# Patient Record
Sex: FEMALE | Race: OTHER | HISPANIC OR LATINO
[De-identification: names, ages, dates, MRNs, and addresses within clinical notes are randomized per-mention and may not be internally consistent; named-entity substitution may affect disease eponyms.]

---

## 2020-04-10 ENCOUNTER — APPOINTMENT (OUTPATIENT)
Dept: DISASTER EMERGENCY | Facility: CLINIC | Age: 55
End: 2020-04-10

## 2020-04-10 ENCOUNTER — APPOINTMENT (OUTPATIENT)
Dept: DISASTER EMERGENCY | Facility: CLINIC | Age: 55
End: 2020-04-10
Payer: MEDICAID

## 2020-04-10 VITALS
TEMPERATURE: 98.3 F | OXYGEN SATURATION: 98 % | SYSTOLIC BLOOD PRESSURE: 155 MMHG | DIASTOLIC BLOOD PRESSURE: 84 MMHG | HEART RATE: 73 BPM

## 2020-04-10 DIAGNOSIS — Z20.828 CONTACT WITH AND (SUSPECTED) EXPOSURE TO OTHER VIRAL COMMUNICABLE DISEASES: ICD-10-CM

## 2020-04-10 PROCEDURE — 99202 OFFICE O/P NEW SF 15 MIN: CPT

## 2020-04-10 NOTE — HISTORY OF PRESENT ILLNESS
[Patient presents to the office today for COVID-19 evaluation and testing.] : Patient presents to the office today for COVID-19 evaluation and testing. [Patient has been pre-screened by RN at call center for appointment today with our facility.] : Patient has been pre-screened by RN at call center for appointment today with our facility. [FreeTextEntry1] : 53 yo F with no PMH sore throat, feverish/chills. Normal temp however. Headaches, fatigue for past 2 weeks now. Gets shortness of breath with lying down. Close contact to known positive.  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 Patient told to seek emergent care if symptoms occur. [TextBox_66] : none

## 2020-04-11 ENCOUNTER — TRANSCRIPTION ENCOUNTER (OUTPATIENT)
Age: 55
End: 2020-04-11

## 2020-04-11 LAB — SARS-COV-2 N GENE NPH QL NAA+PROBE: NOT DETECTED

## 2021-05-08 ENCOUNTER — EMERGENCY (EMERGENCY)
Facility: HOSPITAL | Age: 56
LOS: 1 days | Discharge: HOME | End: 2021-05-08
Attending: EMERGENCY MEDICINE
Payer: MEDICAID

## 2021-05-08 VITALS
HEIGHT: 62 IN | TEMPERATURE: 98 F | OXYGEN SATURATION: 95 % | RESPIRATION RATE: 17 BRPM | DIASTOLIC BLOOD PRESSURE: 91 MMHG | WEIGHT: 195.99 LBS | SYSTOLIC BLOOD PRESSURE: 163 MMHG | HEART RATE: 78 BPM

## 2021-05-08 LAB
BASOPHILS # BLD AUTO: 0.07 K/UL — SIGNIFICANT CHANGE UP (ref 0–0.2)
BASOPHILS NFR BLD AUTO: 0.8 % — SIGNIFICANT CHANGE UP (ref 0–2)
EOSINOPHIL # BLD AUTO: 0.14 K/UL — SIGNIFICANT CHANGE UP (ref 0–0.5)
EOSINOPHIL NFR BLD AUTO: 1.6 % — SIGNIFICANT CHANGE UP (ref 0–6)
HCT VFR BLD CALC: 38.3 % — SIGNIFICANT CHANGE UP (ref 34.5–45)
HGB BLD-MCNC: 12 G/DL — SIGNIFICANT CHANGE UP (ref 11.5–15.5)
IMM GRANULOCYTES NFR BLD AUTO: 0.5 % — SIGNIFICANT CHANGE UP (ref 0–1.5)
LYMPHOCYTES # BLD AUTO: 3.63 K/UL — HIGH (ref 1–3.3)
LYMPHOCYTES # BLD AUTO: 41.1 % — SIGNIFICANT CHANGE UP (ref 13–44)
MCHC RBC-ENTMCNC: 28.5 PG — SIGNIFICANT CHANGE UP (ref 27–34)
MCHC RBC-ENTMCNC: 31.3 GM/DL — LOW (ref 32–36)
MCV RBC AUTO: 91 FL — SIGNIFICANT CHANGE UP (ref 80–100)
MONOCYTES # BLD AUTO: 0.6 K/UL — SIGNIFICANT CHANGE UP (ref 0–0.9)
MONOCYTES NFR BLD AUTO: 6.8 % — SIGNIFICANT CHANGE UP (ref 2–14)
NEUTROPHILS # BLD AUTO: 4.36 K/UL — SIGNIFICANT CHANGE UP (ref 1.8–7.4)
NEUTROPHILS NFR BLD AUTO: 49.2 % — SIGNIFICANT CHANGE UP (ref 43–77)
NRBC # BLD: 0 /100 WBCS — SIGNIFICANT CHANGE UP (ref 0–0)
PLATELET # BLD AUTO: 562 K/UL — HIGH (ref 150–400)
RBC # BLD: 4.21 M/UL — SIGNIFICANT CHANGE UP (ref 3.8–5.2)
RBC # FLD: 13.6 % — SIGNIFICANT CHANGE UP (ref 10.3–14.5)
WBC # BLD: 8.84 K/UL — SIGNIFICANT CHANGE UP (ref 3.8–10.5)
WBC # FLD AUTO: 8.84 K/UL — SIGNIFICANT CHANGE UP (ref 3.8–10.5)

## 2021-05-08 PROCEDURE — 93010 ELECTROCARDIOGRAM REPORT: CPT

## 2021-05-08 PROCEDURE — 99285 EMERGENCY DEPT VISIT HI MDM: CPT

## 2021-05-09 VITALS
RESPIRATION RATE: 18 BRPM | OXYGEN SATURATION: 97 % | DIASTOLIC BLOOD PRESSURE: 88 MMHG | HEART RATE: 80 BPM | TEMPERATURE: 98 F | SYSTOLIC BLOOD PRESSURE: 142 MMHG

## 2021-05-09 LAB
ALBUMIN SERPL ELPH-MCNC: 3.8 G/DL — SIGNIFICANT CHANGE UP (ref 3.3–5)
ALP SERPL-CCNC: 104 U/L — SIGNIFICANT CHANGE UP (ref 40–120)
ALT FLD-CCNC: 79 U/L — HIGH (ref 10–45)
ANION GAP SERPL CALC-SCNC: 12 MMOL/L — SIGNIFICANT CHANGE UP (ref 5–17)
APTT BLD: 30.7 SEC — SIGNIFICANT CHANGE UP (ref 27.5–35.5)
AST SERPL-CCNC: 31 U/L — SIGNIFICANT CHANGE UP (ref 10–40)
BILIRUB SERPL-MCNC: 0.2 MG/DL — SIGNIFICANT CHANGE UP (ref 0.2–1.2)
BUN SERPL-MCNC: 21 MG/DL — SIGNIFICANT CHANGE UP (ref 7–23)
CALCIUM SERPL-MCNC: 9.2 MG/DL — SIGNIFICANT CHANGE UP (ref 8.4–10.5)
CHLORIDE SERPL-SCNC: 105 MMOL/L — SIGNIFICANT CHANGE UP (ref 96–108)
CO2 SERPL-SCNC: 24 MMOL/L — SIGNIFICANT CHANGE UP (ref 22–31)
CREAT SERPL-MCNC: 0.88 MG/DL — SIGNIFICANT CHANGE UP (ref 0.5–1.3)
GLUCOSE SERPL-MCNC: 99 MG/DL — SIGNIFICANT CHANGE UP (ref 70–99)
INR BLD: 0.99 RATIO — SIGNIFICANT CHANGE UP (ref 0.88–1.16)
POTASSIUM SERPL-MCNC: 4.4 MMOL/L — SIGNIFICANT CHANGE UP (ref 3.5–5.3)
POTASSIUM SERPL-SCNC: 4.4 MMOL/L — SIGNIFICANT CHANGE UP (ref 3.5–5.3)
PROT SERPL-MCNC: 7.4 G/DL — SIGNIFICANT CHANGE UP (ref 6–8.3)
PROTHROM AB SERPL-ACNC: 11.9 SEC — SIGNIFICANT CHANGE UP (ref 10.6–13.6)
SODIUM SERPL-SCNC: 141 MMOL/L — SIGNIFICANT CHANGE UP (ref 135–145)
TROPONIN T, HIGH SENSITIVITY RESULT: <6 NG/L — SIGNIFICANT CHANGE UP (ref 0–51)

## 2021-05-09 PROCEDURE — 71275 CT ANGIOGRAPHY CHEST: CPT

## 2021-05-09 PROCEDURE — 85610 PROTHROMBIN TIME: CPT

## 2021-05-09 PROCEDURE — 99284 EMERGENCY DEPT VISIT MOD MDM: CPT | Mod: 25

## 2021-05-09 PROCEDURE — 36000 PLACE NEEDLE IN VEIN: CPT | Mod: XU

## 2021-05-09 PROCEDURE — 93005 ELECTROCARDIOGRAM TRACING: CPT

## 2021-05-09 PROCEDURE — 80053 COMPREHEN METABOLIC PANEL: CPT

## 2021-05-09 PROCEDURE — 85025 COMPLETE CBC W/AUTO DIFF WBC: CPT

## 2021-05-09 PROCEDURE — 84484 ASSAY OF TROPONIN QUANT: CPT

## 2021-05-09 PROCEDURE — 71275 CT ANGIOGRAPHY CHEST: CPT | Mod: 26,MA

## 2021-05-09 PROCEDURE — 85730 THROMBOPLASTIN TIME PARTIAL: CPT

## 2021-05-09 NOTE — ED PROVIDER NOTE - ATTENDING CONTRIBUTION TO CARE
Patient is a 56 yo F with no known chronic medical problems here for evaluation of 4 days of pleuritic chest pain associated with shortness of breath. She reports having COVID in April (4/18). She denies any fevers, chills, nausea, vomiting. No history of travel. No prior DVT/PE. She had an outpatient work up and was told her d-dimer was elevated so she should come in for a CT Scan to evaluate for PE.     VS noted  Gen. no acute distress, Non toxic   HEENT: EOMI, mmm  Lungs: CTAB/L no C/ W /R   CVS: RRR   Abd; Soft non tender, non distended   Ext: no edema, no calf tenderness  Skin: no rash  Neuro AAOx3 non focal clear speech  a/p: elevated d-dimer on outpatient labs - plan for ekg, labs, CTA Chest to evaluate for PE.   - Eveline SANCHEZ

## 2021-05-09 NOTE — ED PROVIDER NOTE - PATIENT PORTAL LINK FT
You can access the FollowMyHealth Patient Portal offered by [facility] by registering at the following website: [email or web address]. By joining [email or web address]’s FollowMyHealth portal, you will also be able to view your health information using other applications (apps) compatible with our system.

## 2021-05-09 NOTE — ED ADULT NURSE NOTE - OBJECTIVE STATEMENT
55 year old female presents ambulatory to ED after being instructed to by her PCP for elevated D-dimer labs. Pt is complaining of chest discomfort, pleuritic, constant since onset (3-4 days ago) and SOB. Pt was diagnosed with COVID on April 18th and reports that she has had a consistent cough since then. D-dimer 2600 outpatient. Pt denies  headache, nausea, vomiting, diarrhaea, abdominal pain, fever, chills, urinary symptoms, lightheadedness, dizziness, changes in vision

## 2021-05-09 NOTE — ED PROVIDER NOTE - NSFOLLOWUPINSTRUCTIONS_ED_ALL_ED_FT
Please see attached information on chest pain.     Return to the ER immediately for new or worsening chest pain, shortness of breath, or any other concerning symptoms.     Follow up with your PCP as discussed.     You can take over the counter Tylenol and Motrin for you pain.

## 2021-05-09 NOTE — ED PROVIDER NOTE - CLINICAL SUMMARY MEDICAL DECISION MAKING FREE TEXT BOX
55F presenting w SOB/pleuritic CP - recent covid infection w elevated dimer. PE: VSS, clear lungs. Ddx: Will assess for PE given symptoms and dimer. Plan: Labs, CTA. reassess. JESSICA Palma PGY2

## 2021-05-09 NOTE — ED PROVIDER NOTE - OBJECTIVE STATEMENT
55F w no known pmhx presenting with CC of CP, pleuritic, constant since onset (3-4 days ago). Associated with SOB. Pt was diagnosed with COVID on April 18th. Symptoms at that time included f/c, n/v, cough. Other symptoms resolved, but cough is persistent. Denies any leg pain/swelling, hemoptysis, history of DVT/PE, malignancy, hormone use, recent surgery, immobilization, or trauma. D-dimer 2600 outpatient.

## 2021-07-20 NOTE — ED PROVIDER NOTE - NO SIGNIFICANT PAST SURGICAL HISTORY
<<----- Click to add NO significant Past Surgical History 4 = Moderately ill – overt symptoms causing noticeable, but modest, functional impairment or distress; symptom level may warrant medication 4 = Moderately ill – overt symptoms causing noticeable, but modest, functional impairment or distress; symptom level may warrant medication 4 = Moderately ill – overt symptoms causing noticeable, but modest, functional impairment or distress; symptom level may warrant medication 4 = Moderately ill – overt symptoms causing noticeable, but modest, functional impairment or distress; symptom level may warrant medication 4 = Moderately ill – overt symptoms causing noticeable, but modest, functional impairment or distress; symptom level may warrant medication 4 = Moderately ill – overt symptoms causing noticeable, but modest, functional impairment or distress; symptom level may warrant medication 4 = Moderately ill – overt symptoms causing noticeable, but modest, functional impairment or distress; symptom level may warrant medication 4 = Moderately ill – overt symptoms causing noticeable, but modest, functional impairment or distress; symptom level may warrant medication 4 = Moderately ill – overt symptoms causing noticeable, but modest, functional impairment or distress; symptom level may warrant medication 4 = Moderately ill – overt symptoms causing noticeable, but modest, functional impairment or distress; symptom level may warrant medication 4 = Moderately ill – overt symptoms causing noticeable, but modest, functional impairment or distress; symptom level may warrant medication

## 2021-08-14 ENCOUNTER — APPOINTMENT (OUTPATIENT)
Dept: DISASTER EMERGENCY | Facility: OTHER | Age: 56
End: 2021-08-14
Payer: COMMERCIAL

## 2021-08-14 PROBLEM — Z78.9 OTHER SPECIFIED HEALTH STATUS: Chronic | Status: ACTIVE | Noted: 2021-05-09

## 2021-08-14 PROCEDURE — 0001A: CPT

## 2021-09-18 ENCOUNTER — APPOINTMENT (OUTPATIENT)
Dept: DISASTER EMERGENCY | Facility: OTHER | Age: 56
End: 2021-09-18
Payer: MEDICAID

## 2021-09-18 PROCEDURE — 0002A: CPT

## 2022-05-31 ENCOUNTER — NON-APPOINTMENT (OUTPATIENT)
Age: 57
End: 2022-05-31

## 2022-08-08 ENCOUNTER — NON-APPOINTMENT (OUTPATIENT)
Age: 57
End: 2022-08-08

## 2023-06-07 ENCOUNTER — EMERGENCY (EMERGENCY)
Facility: HOSPITAL | Age: 58
LOS: 1 days | Discharge: HOME | End: 2023-06-07
Attending: EMERGENCY MEDICINE
Payer: MEDICAID

## 2023-06-07 VITALS
RESPIRATION RATE: 19 BRPM | DIASTOLIC BLOOD PRESSURE: 93 MMHG | WEIGHT: 210.1 LBS | SYSTOLIC BLOOD PRESSURE: 196 MMHG | OXYGEN SATURATION: 96 % | HEART RATE: 66 BPM | TEMPERATURE: 98 F | HEIGHT: 62 IN

## 2023-06-07 VITALS
HEART RATE: 78 BPM | RESPIRATION RATE: 18 BRPM | DIASTOLIC BLOOD PRESSURE: 92 MMHG | SYSTOLIC BLOOD PRESSURE: 171 MMHG | OXYGEN SATURATION: 94 % | TEMPERATURE: 98 F

## 2023-06-07 LAB
ALBUMIN SERPL ELPH-MCNC: 4.3 G/DL — SIGNIFICANT CHANGE UP (ref 3.3–5)
ALP SERPL-CCNC: 118 U/L — SIGNIFICANT CHANGE UP (ref 40–120)
ALT FLD-CCNC: 43 U/L — SIGNIFICANT CHANGE UP (ref 10–45)
ANION GAP SERPL CALC-SCNC: 12 MMOL/L — SIGNIFICANT CHANGE UP (ref 5–17)
AST SERPL-CCNC: 25 U/L — SIGNIFICANT CHANGE UP (ref 10–40)
BASOPHILS # BLD AUTO: 0.06 K/UL — SIGNIFICANT CHANGE UP (ref 0–0.2)
BASOPHILS NFR BLD AUTO: 0.7 % — SIGNIFICANT CHANGE UP (ref 0–2)
BILIRUB SERPL-MCNC: 0.3 MG/DL — SIGNIFICANT CHANGE UP (ref 0.2–1.2)
BUN SERPL-MCNC: 11 MG/DL — SIGNIFICANT CHANGE UP (ref 7–23)
CALCIUM SERPL-MCNC: 9.3 MG/DL — SIGNIFICANT CHANGE UP (ref 8.4–10.5)
CHLORIDE SERPL-SCNC: 108 MMOL/L — SIGNIFICANT CHANGE UP (ref 96–108)
CO2 SERPL-SCNC: 24 MMOL/L — SIGNIFICANT CHANGE UP (ref 22–31)
CREAT SERPL-MCNC: 0.63 MG/DL — SIGNIFICANT CHANGE UP (ref 0.5–1.3)
D DIMER BLD IA.RAPID-MCNC: <150 NG/ML DDU — SIGNIFICANT CHANGE UP
EGFR: 102 ML/MIN/1.73M2 — SIGNIFICANT CHANGE UP
EOSINOPHIL # BLD AUTO: 0.14 K/UL — SIGNIFICANT CHANGE UP (ref 0–0.5)
EOSINOPHIL NFR BLD AUTO: 1.7 % — SIGNIFICANT CHANGE UP (ref 0–6)
FLUAV AG NPH QL: SIGNIFICANT CHANGE UP
FLUBV AG NPH QL: SIGNIFICANT CHANGE UP
GLUCOSE SERPL-MCNC: 104 MG/DL — HIGH (ref 70–99)
HCT VFR BLD CALC: 40.8 % — SIGNIFICANT CHANGE UP (ref 34.5–45)
HGB BLD-MCNC: 13.3 G/DL — SIGNIFICANT CHANGE UP (ref 11.5–15.5)
IMM GRANULOCYTES NFR BLD AUTO: 0.4 % — SIGNIFICANT CHANGE UP (ref 0–0.9)
LYMPHOCYTES # BLD AUTO: 2.91 K/UL — SIGNIFICANT CHANGE UP (ref 1–3.3)
LYMPHOCYTES # BLD AUTO: 35.2 % — SIGNIFICANT CHANGE UP (ref 13–44)
MCHC RBC-ENTMCNC: 29.4 PG — SIGNIFICANT CHANGE UP (ref 27–34)
MCHC RBC-ENTMCNC: 32.6 GM/DL — SIGNIFICANT CHANGE UP (ref 32–36)
MCV RBC AUTO: 90.1 FL — SIGNIFICANT CHANGE UP (ref 80–100)
MONOCYTES # BLD AUTO: 0.75 K/UL — SIGNIFICANT CHANGE UP (ref 0–0.9)
MONOCYTES NFR BLD AUTO: 9.1 % — SIGNIFICANT CHANGE UP (ref 2–14)
NEUTROPHILS # BLD AUTO: 4.38 K/UL — SIGNIFICANT CHANGE UP (ref 1.8–7.4)
NEUTROPHILS NFR BLD AUTO: 52.9 % — SIGNIFICANT CHANGE UP (ref 43–77)
NRBC # BLD: 0 /100 WBCS — SIGNIFICANT CHANGE UP (ref 0–0)
NT-PROBNP SERPL-SCNC: 58 PG/ML — SIGNIFICANT CHANGE UP (ref 0–300)
PLATELET # BLD AUTO: 304 K/UL — SIGNIFICANT CHANGE UP (ref 150–400)
POTASSIUM SERPL-MCNC: 3.8 MMOL/L — SIGNIFICANT CHANGE UP (ref 3.5–5.3)
POTASSIUM SERPL-SCNC: 3.8 MMOL/L — SIGNIFICANT CHANGE UP (ref 3.5–5.3)
PROT SERPL-MCNC: 7.6 G/DL — SIGNIFICANT CHANGE UP (ref 6–8.3)
RBC # BLD: 4.53 M/UL — SIGNIFICANT CHANGE UP (ref 3.8–5.2)
RBC # FLD: 13.5 % — SIGNIFICANT CHANGE UP (ref 10.3–14.5)
RSV RNA NPH QL NAA+NON-PROBE: SIGNIFICANT CHANGE UP
SARS-COV-2 RNA SPEC QL NAA+PROBE: SIGNIFICANT CHANGE UP
SODIUM SERPL-SCNC: 144 MMOL/L — SIGNIFICANT CHANGE UP (ref 135–145)
TROPONIN T, HIGH SENSITIVITY RESULT: <6 NG/L — SIGNIFICANT CHANGE UP (ref 0–51)
WBC # BLD: 8.27 K/UL — SIGNIFICANT CHANGE UP (ref 3.8–10.5)
WBC # FLD AUTO: 8.27 K/UL — SIGNIFICANT CHANGE UP (ref 3.8–10.5)

## 2023-06-07 PROCEDURE — 80053 COMPREHEN METABOLIC PANEL: CPT

## 2023-06-07 PROCEDURE — 73564 X-RAY EXAM KNEE 4 OR MORE: CPT

## 2023-06-07 PROCEDURE — 71045 X-RAY EXAM CHEST 1 VIEW: CPT | Mod: 26

## 2023-06-07 PROCEDURE — 99284 EMERGENCY DEPT VISIT MOD MDM: CPT

## 2023-06-07 PROCEDURE — 93005 ELECTROCARDIOGRAM TRACING: CPT

## 2023-06-07 PROCEDURE — 85379 FIBRIN DEGRADATION QUANT: CPT

## 2023-06-07 PROCEDURE — 87637 SARSCOV2&INF A&B&RSV AMP PRB: CPT

## 2023-06-07 PROCEDURE — 84484 ASSAY OF TROPONIN QUANT: CPT

## 2023-06-07 PROCEDURE — 93971 EXTREMITY STUDY: CPT

## 2023-06-07 PROCEDURE — 83880 ASSAY OF NATRIURETIC PEPTIDE: CPT

## 2023-06-07 PROCEDURE — 93971 EXTREMITY STUDY: CPT | Mod: 26,LT

## 2023-06-07 PROCEDURE — 99285 EMERGENCY DEPT VISIT HI MDM: CPT | Mod: 25

## 2023-06-07 PROCEDURE — 71045 X-RAY EXAM CHEST 1 VIEW: CPT

## 2023-06-07 PROCEDURE — 85025 COMPLETE CBC W/AUTO DIFF WBC: CPT

## 2023-06-07 PROCEDURE — 73564 X-RAY EXAM KNEE 4 OR MORE: CPT | Mod: 26,LT

## 2023-06-07 RX ORDER — ACETAMINOPHEN 500 MG
1000 TABLET ORAL ONCE
Refills: 0 | Status: COMPLETED | OUTPATIENT
Start: 2023-06-07 | End: 2023-06-07

## 2023-06-07 NOTE — ED PROVIDER NOTE - PHYSICAL EXAMINATION
Physical Exam:   GEN: in no acute distress, AAOx3  HENT: NCAT, MMM, no stridor  EYES: PERRLA, EOMI  RESP: CTAB, no respiratory distress  CV: normal rate and regular rhythm, S1 S2  ABD: soft and NTND  EXT: No edema, No bony deformity of extremities. no swelling or color changes or warmth noted htorughout extremity, no ttp or pain with ranging of lower extremities at knee, ankle , or hip.   SKIN: No skin breaks, skin color normal for race  NEURO: CN grossly intact, No focal motor or sensory deficits.

## 2023-06-07 NOTE — ED ADULT NURSE NOTE - OBJECTIVE STATEMENT
pt 59 yo female states 2 episodes sharp shock like pain posterior left knee radiating to foot yesterday lasting few minutes pt denies injury to area pt also states prior to this some headache and posterior right lung pain pt denies any hx or meds denies visual changes pt states pain at a 1 very little to leg on arrival to [place] pt skin warm dry pt with ilateral good pedal pulses color within normal limits sensory intact pt found to be hypertensive on arrival denies any hx of such no recent long plane or car rides pt pending eval by md in [place] area

## 2023-06-07 NOTE — ED PROVIDER NOTE - ATTENDING CONTRIBUTION TO CARE
Marquis Rainey MD:  I personally saw the patient and performed a substantive portion of the visit including all aspects of the medical decision making.    MDM: 58-year-old female who is otherwise healthy who presents with left posterior knee pain with tingling that radiates down the posterior lower extremity to the level of the ankle, as well as pain to the upper right back.  Patient states she has no low back pain, and no radiation of symptoms from the knee downwards, or any symptoms affecting the buttock or thigh.    ROS: patient denies fevers, chills, cough, chest pain, shortness of breath, nausea, vomiting, diaphoresis, dizziness, syncope, leg pain/swelling, low back pain, saddle anesthesia, bowel or bladder dysfunction, numbness, or weakness.    On examination, patient with elevated blood pressure, of 196/93, which improved to 173/101 prior to intervention, otherwise stable vitals.  Patient is well-appearing, nontoxic, no acute distress.  Cardiac RRR, lungs CTAB with no wheezing/rubs/rhonchi.  No C/T/L-spine tenderness, with normal range of motion of spine.  Normal motor and sensation in all dermatomes and myotomes of all 4 extremities.  Left posterior knee with mild tenderness, but no fullness, mild tenderness over the left calf.    Will keep patient on cardiac monitor, obtain EKG and troponin to evaluate for possible cardiac abnormality including ACS and arrhythmia.  Will obtain chest x-ray to evaluate for acute cardiopulmonary pathology.  Will obtain x-ray of left knee.  Will obtain ultrasound of left leg to assess for DVT.  Will obtain D-dimer to assess for PE.    My independent interpretation of the EKG shows:  Normal sinus rhythm with rate of 61 bpm, normal axis, isolated T wave version in lead III, no ST elevations or depressions.     Differential includes but is not limited to: See above    Patient with new problems requiring additional work-up and treatment, following orders: see above  Discussed case with: N/A  Obtained and reviewed external records: Prior ED record  Additional history obtained from:  about  Chronic conditions and social determinants of health affecting care:   See above Marquis Rainey MD:  I personally saw the patient and performed a substantive portion of the visit including all aspects of the medical decision making.    MDM: 58-year-old female who is otherwise healthy who presents with left posterior knee pain with tingling that radiates down the posterior lower extremity to the level of the ankle, as well as pain to the upper right back.  Patient states she has no low back pain, and no radiation of symptoms from the knee downwards, or any symptoms affecting the buttock or thigh.    ROS: patient denies fevers, chills, cough, chest pain, shortness of breath, nausea, vomiting, diaphoresis, dizziness, syncope, leg pain/swelling, low back pain, saddle anesthesia, bowel or bladder dysfunction, numbness, or weakness.    On examination, patient with elevated blood pressure, of 196/93, which improved to 173/101 prior to intervention, otherwise stable vitals.  Patient is well-appearing, nontoxic, no acute distress.  Cardiac RRR, lungs CTAB with no wheezing/rubs/rhonchi.  No C/T/L-spine tenderness, with normal range of motion of spine.  Normal motor and sensation in all dermatomes and myotomes of all 4 extremities.  Left posterior knee with mild tenderness, but no fullness, mild tenderness over the left calf.    Will keep patient on cardiac monitor, obtain EKG and troponin to evaluate for possible cardiac abnormality including ACS and arrhythmia.  Will obtain chest x-ray to evaluate for acute cardiopulmonary pathology.  Will obtain x-ray of left knee.  Will obtain ultrasound of left leg to assess for DVT.  Will obtain D-dimer to assess for PE.    My independent interpretation of the EKG shows:  Normal sinus rhythm with rate of 61 bpm, normal axis, isolated T wave version in lead III, no ST elevations or depressions.     Labs reviewed, nonactionable, D-dimer less than 150, troponin less than 6, 1 troponin indicated given duration of symptoms.    My independent interpretation of the chest and left knee x-ray images shows no focal consolidation of chest, no acute fracture or dislocation of knee.   More details to be seen in the official radiologist read.     Differential includes but is not limited to: See above    Patient with new problems requiring additional work-up and treatment, following orders: see above  Discussed case with: N/A  Obtained and reviewed external records: Prior ED record  Additional history obtained from:  about  Chronic conditions and social determinants of health affecting care:   See above Marquis Rainey MD:  I personally saw the patient and performed a substantive portion of the visit including all aspects of the medical decision making.    MDM: 58-year-old female who is otherwise healthy who presents with left posterior knee pain with tingling that radiates down the posterior lower extremity to the level of the ankle, as well as pain to the upper right back.  Patient states she has no low back pain, and no radiation of symptoms from the knee downwards, or any symptoms affecting the buttock or thigh.    ROS: patient denies fevers, chills, cough, chest pain, shortness of breath, nausea, vomiting, diaphoresis, dizziness, syncope, leg pain/swelling, low back pain, saddle anesthesia, bowel or bladder dysfunction, numbness, or weakness.    On examination, patient with elevated blood pressure, of 196/93, which improved to 173/101 prior to intervention, otherwise stable vitals.  Patient is well-appearing, nontoxic, no acute distress.  Cardiac RRR, lungs CTAB with no wheezing/rubs/rhonchi.  No C/T/L-spine tenderness, with normal range of motion of spine.  Normal motor and sensation in all dermatomes and myotomes of all 4 extremities.  Left posterior knee with mild tenderness, but no fullness, mild tenderness over the left calf.    Will keep patient on cardiac monitor, obtain EKG and troponin to evaluate for possible cardiac abnormality including ACS and arrhythmia.  Will obtain chest x-ray to evaluate for acute cardiopulmonary pathology.  Will obtain x-ray of left knee.  Will obtain ultrasound of left leg to assess for DVT.  Will obtain D-dimer to assess for PE.    My independent interpretation of the EKG shows:  Normal sinus rhythm with rate of 61 bpm, normal axis, isolated T wave version in lead III, no ST elevations or depressions.     Labs reviewed, nonactionable, D-dimer less than 150, troponin less than 6, 1 troponin indicated given duration of symptoms.    My independent interpretation of the chest and left knee x-ray images shows no focal consolidation of chest, no acute fracture or dislocation of knee.   More details to be seen in the official radiologist read.     Ultrasound negative for DVT.  Patient reports that their symptoms have improved. Stable vitals except for elevated blood pressure, which patient is not symptomatic from. Repeat cardiovascular examination shows NRRR, equal pulses in all 4 extremities. Repeat pulmonary examination shows equal bilateral lung sounds.  Repeat MSK examination shows normal ROM, soft compartments, and NVI. Ambulatory in the ED with no antalgic gait and no assistance.  Stable for discharge with close follow-up and strict return precautions. Discussed the indications and side-effects of applicable medications. The patient has been informed of all concerning signs and symptoms to return to Emergency Department, the necessity to follow up with PMD within 2-3 days, and to address elevated blood pressure with physician, and cardiologist in 3 to 4 days, and to avoid exertion until follow-up with cardiologist was explained, or to return to the ED if unable to follow-up appropriately, and the patient reports understanding of above with capacity and insight.    Differential includes but is not limited to: See above    Patient with new problems requiring additional work-up and treatment, following orders: see above  Discussed case with: N/A  Obtained and reviewed external records: Prior ED record  Additional history obtained from:  about  Chronic conditions and social determinants of health affecting care:   See above

## 2023-06-07 NOTE — ED PROVIDER NOTE - OBJECTIVE STATEMENT
58-year-old with no significant past medical history presenting with posterior left knee pain since yesterday, Works as a cleaning lady, had a significant drop yesterday, and was feeling pain since that morning, feeling worse today with some radiation to the calf and occasionally of the left anterior thigh.  Patient denies any blood clot history, recent travel, cancer history, recent surgeries.  Does state that her mother had a blood clot at some point, patient states patient states she has not had any significant symptoms like this since.  States she is occasionally feeling some pain in her right back chest but is not feeling it at this moment nor she feeling any calf or thigh pain at this moment.  Denies any dizziness, headache, trouble breathing, chest pain, fever, chills.

## 2023-06-07 NOTE — ED PROVIDER NOTE - CLINICAL SUMMARY MEDICAL DECISION MAKING FREE TEXT BOX
patient appearing well here, vital signs within normal limits, chest x-ray and labs including D-dimer proBNP and troponin all grossly negative, still pending D-dimer study to rule out any DVT in the lower extremity.

## 2023-06-07 NOTE — ED PROVIDER NOTE - PROGRESS NOTE DETAILS
Vital signs stable, patient asymptomatic, ultrasound negative for any signs of DVT, will discharge patient with instructions to follow-up with her doctor should her symptoms continue, patient understood endorse that she has Advil at home, counseled on proper dosage and patient was discharged in stable condition.

## 2023-06-07 NOTE — ED PROVIDER NOTE - PATIENT PORTAL LINK FT
You can access the FollowMyHealth Patient Portal offered by [facility] by registering at the following website: [email or web address]. By joining [facility])’s FollowMyHealth portal, you will also be able to view your health information using other applications (apps) compatible with our system.

## 2023-06-07 NOTE — ED ADULT NURSE REASSESSMENT NOTE - NS ED NURSE REASSESS COMMENT FT1
pt on cardiac monitor pending lab results pt declined tylenol with md notified NSR on monitor
pt for discharge md   gave pt all her results with copy of radiology reports pt  to f/u with pmd for her elevated b/p as instructed by Dr Rainey pt verbalized understanding of all instructions ambulatory out of er with husbnd alert and oriented
pt to vascular via stretcher

## 2025-06-09 NOTE — ED PROVIDER NOTE - PROGRESS NOTE
Patient has not seen PCP in 3 years. Called pt to book appt, no answer left vm.  BMdrt message sent.  
Stable.

## 2025-06-24 ENCOUNTER — EMERGENCY (EMERGENCY)
Facility: HOSPITAL | Age: 60
LOS: 1 days | End: 2025-06-24
Attending: EMERGENCY MEDICINE
Payer: MEDICAID

## 2025-06-24 VITALS
DIASTOLIC BLOOD PRESSURE: 95 MMHG | WEIGHT: 220.02 LBS | SYSTOLIC BLOOD PRESSURE: 155 MMHG | HEART RATE: 81 BPM | HEIGHT: 63 IN | RESPIRATION RATE: 18 BRPM | TEMPERATURE: 98 F | OXYGEN SATURATION: 96 %

## 2025-06-24 VITALS
OXYGEN SATURATION: 96 % | SYSTOLIC BLOOD PRESSURE: 149 MMHG | RESPIRATION RATE: 20 BRPM | DIASTOLIC BLOOD PRESSURE: 89 MMHG | TEMPERATURE: 98 F | HEART RATE: 85 BPM

## 2025-06-24 LAB
FLUAV AG NPH QL: SIGNIFICANT CHANGE UP
FLUBV AG NPH QL: SIGNIFICANT CHANGE UP
RSV RNA NPH QL NAA+NON-PROBE: SIGNIFICANT CHANGE UP
SARS-COV-2 RNA SPEC QL NAA+PROBE: SIGNIFICANT CHANGE UP
SOURCE RESPIRATORY: SIGNIFICANT CHANGE UP

## 2025-06-24 PROCEDURE — 93005 ELECTROCARDIOGRAM TRACING: CPT

## 2025-06-24 PROCEDURE — 99284 EMERGENCY DEPT VISIT MOD MDM: CPT

## 2025-06-24 PROCEDURE — 93010 ELECTROCARDIOGRAM REPORT: CPT

## 2025-06-24 PROCEDURE — 87637 SARSCOV2&INF A&B&RSV AMP PRB: CPT

## 2025-06-24 PROCEDURE — 99283 EMERGENCY DEPT VISIT LOW MDM: CPT | Mod: 25

## 2025-06-24 PROCEDURE — 0241U: CPT

## 2025-06-24 NOTE — ED PROVIDER NOTE - PATIENT PORTAL LINK FT
You can access the FollowMyHealth Patient Portal offered by [facility] by registering at the following website: [email or web address]. By joining [facility]’s FollowMyHealth portal, you will also be able to view your health information using other applications (apps) compatible with our system.

## 2025-06-24 NOTE — ED PROVIDER NOTE - NS ED ATTENDING STATEMENT MOD
Attending with
Pt comfortable, NAD.  NL BP, only min elevated WBC count.  no fevers.  Will DC on pain medication and abx to follow up with urologist today and advised to return to ER ASAP if worsening of pain or any fevers.  Pt agrees with plan.

## 2025-06-24 NOTE — ED PROVIDER NOTE - NSFOLLOWUPINSTRUCTIONS_ED_ALL_ED_FT
You were evaluated in the emergency department today for your congestion and cough. Your evaluation suggests that your symptoms are most likely due to a viral illness, which will improve on its own with rest and fluids.    We recommend you take 600mg ibuprofen every 6 hours or acetaminophen 650mg every 6 hours as needed for pain or fevers. If needed, you can alternate these medications so that you take one medication every 3 hours. For instance, at noon take ibuprofen, then at 3pm take acetaminophen, then at 6pm take ibuprofen.     Please schedule an appointment for follow up with your primary care physician this week.    Return to the emergency department if you experience worsening cough, fever 100.4 ° F or greater not controlled by acetaminophen or Ibuprofen, recurrent vomiting, chest pain, shortness of breath, or any other concerning symptoms.

## 2025-06-24 NOTE — ED ADULT NURSE NOTE - NS ED NOTE ABUSE RESPONSE YN
Yes
Universal Safety Interventions
Feeding and  care were discussed today and parent questions were answered

## 2025-06-24 NOTE — ED PROVIDER NOTE - CLINICAL SUMMARY MEDICAL DECISION MAKING FREE TEXT BOX
Patient is a 60 year old female with no significant past medical history who presents to the emergency department for evaluation of congestion and productive cough since yesterday. Physical exam unremarkable. Presentation consistent with upper respiratory viral illness. Will test for flu/COVID/RSV and symptomatic control. Patient is a 60 year old female with no significant past medical history who presents to the emergency department for evaluation of congestion and productive cough since yesterday. Physical exam unremarkable. Nml heart and lung exam. No LE swelling. Nontox appearing. Presentation consistent with upper respiratory viral illness. Will test for flu/COVID/RSV and symptomatic control. Katelynn PALACIO.

## 2025-06-24 NOTE — ED PROVIDER NOTE - PROGRESS NOTE DETAILS
Chiqui Rice MD, [facility] PGY-1: Discussed with pt results of work up that flu/COVID/RSV testing was negative and EKG showed PVCs and presentation consistent with viral illness, strict return precautions, and need for follow up with primary care doctor.  Patient expressed understanding and agrees with plan.

## 2025-06-24 NOTE — ED PROVIDER NOTE - PHYSICAL EXAMINATION
Vital signs reviewed.  CONSTITUTIONAL: NAD   HEAD: Normocephalic; atraumatic  EYES: EOMI, PERRL, no conjunctival injection, no scleral icterus  MOUTH/THROAT:  MMM; no oropharyngeal erythema; blister to left upper border of lower lip  NECK: Trachea midline, no JVD  CV: Normal S1, S2; no audible murmurs  RESP: normal work of breathing; CTAB   ABD: soft, non-distended; non-tender to palpation   MSK/EXT: no LE edema, no limited ROM  SKIN: No rashes on exposed skin surfaces  NEURO: Moves all extremities spontaneously with no focal deficits, speech is appropriate  PSYCH: well kempt, calm, interactive

## 2025-06-24 NOTE — ED PROVIDER NOTE - OBJECTIVE STATEMENT
Patient is a 60 year old female with no significant past medical history who presents to the emergency department for evaluation of congestion and productive cough since yesterday. She says she woke up this morning with a completely clogged right nostril. Patient notes associated loss of voice and pain with swallowing. She also notes some pain in her right chest associated with coughing which she describes as exactly how it felt when she had COVID previously. No fevers at home, no nausea, vomiting, or abdominal pain. Notes headache, diarrhea 3 days ago. Patient states son is sick at home with similar symptoms. She also notes what she describes as a fever blister on her lower lip which she normally gets when she is sick.

## 2025-06-24 NOTE — ED ADULT NURSE NOTE - OBJECTIVE STATEMENT
59 y/o F presents to the ED with complaints of throat pain, Pt reports that her son at home is experiencing similar symptoms. Pt endorses throat pain, and r sided chest pain upon coughing.  Pt A&Ox3 gross neuro intact, no difficulty speaking in complete sentences, pulses x 4, gant x4.

## 2025-06-24 NOTE — ED ADULT NURSE NOTE - NSFALLUNIVINTERV_ED_ALL_ED
Bed/Stretcher in lowest position, wheels locked, appropriate side rails in place/Call bell, personal items and telephone in reach/Instruct patient to call for assistance before getting out of bed/chair/stretcher/Non-slip footwear applied when patient is off stretcher/[place] to call system/Physically safe environment - no spills, clutter or unnecessary equipment/Purposeful proactive rounding/Room/bathroom lighting operational, light cord in reach